# Patient Record
Sex: FEMALE | Race: WHITE | NOT HISPANIC OR LATINO
[De-identification: names, ages, dates, MRNs, and addresses within clinical notes are randomized per-mention and may not be internally consistent; named-entity substitution may affect disease eponyms.]

---

## 2017-10-18 ENCOUNTER — APPOINTMENT (OUTPATIENT)
Dept: UROGYNECOLOGY | Facility: CLINIC | Age: 67
End: 2017-10-18
Payer: MEDICARE

## 2017-10-18 DIAGNOSIS — N95.0 POSTMENOPAUSAL BLEEDING: ICD-10-CM

## 2017-10-18 DIAGNOSIS — N89.8 OTHER SPECIFIED COMPLICATION OF GENITOURINARY PROSTHETIC DEVICES, IMPLANTS AND GRAFTS, INITIAL ENCOUNTER: ICD-10-CM

## 2017-10-18 DIAGNOSIS — T83.89XA OTHER SPECIFIED COMPLICATION OF GENITOURINARY PROSTHETIC DEVICES, IMPLANTS AND GRAFTS, INITIAL ENCOUNTER: ICD-10-CM

## 2017-10-18 DIAGNOSIS — N81.2 INCOMPLETE UTEROVAGINAL PROLAPSE: ICD-10-CM

## 2017-10-18 PROCEDURE — 99213 OFFICE O/P EST LOW 20 MIN: CPT

## 2017-11-08 ENCOUNTER — RESULT REVIEW (OUTPATIENT)
Age: 67
End: 2017-11-08

## 2017-11-24 ENCOUNTER — APPOINTMENT (OUTPATIENT)
Dept: UROGYNECOLOGY | Facility: CLINIC | Age: 67
End: 2017-11-24
Payer: MEDICARE

## 2017-11-24 DIAGNOSIS — N81.11 CYSTOCELE, MIDLINE: ICD-10-CM

## 2017-11-24 DIAGNOSIS — N39.41 URGE INCONTINENCE: ICD-10-CM

## 2017-11-24 DIAGNOSIS — N95.2 POSTMENOPAUSAL ATROPHIC VAGINITIS: ICD-10-CM

## 2017-11-24 DIAGNOSIS — N89.8 OTHER SPECIFIED NONINFLAMMATORY DISORDERS OF VAGINA: ICD-10-CM

## 2017-11-24 PROCEDURE — 99213 OFFICE O/P EST LOW 20 MIN: CPT

## 2020-05-24 ENCOUNTER — TRANSCRIPTION ENCOUNTER (OUTPATIENT)
Age: 70
End: 2020-05-24

## 2022-02-26 ENCOUNTER — RESULT REVIEW (OUTPATIENT)
Age: 72
End: 2022-02-26

## 2022-08-08 ENCOUNTER — NON-APPOINTMENT (OUTPATIENT)
Age: 72
End: 2022-08-08

## 2022-08-24 ENCOUNTER — APPOINTMENT (OUTPATIENT)
Dept: ORTHOPEDIC SURGERY | Facility: CLINIC | Age: 72
End: 2022-08-24

## 2022-08-24 ENCOUNTER — NON-APPOINTMENT (OUTPATIENT)
Age: 72
End: 2022-08-24

## 2022-08-24 VITALS — SYSTOLIC BLOOD PRESSURE: 106 MMHG | DIASTOLIC BLOOD PRESSURE: 72 MMHG | HEART RATE: 77 BPM

## 2022-08-24 VITALS — HEIGHT: 63 IN | BODY MASS INDEX: 19.14 KG/M2 | WEIGHT: 108 LBS

## 2022-08-24 DIAGNOSIS — M54.50 LOW BACK PAIN, UNSPECIFIED: ICD-10-CM

## 2022-08-24 DIAGNOSIS — M42.05: ICD-10-CM

## 2022-08-24 DIAGNOSIS — M54.9 DORSALGIA, UNSPECIFIED: ICD-10-CM

## 2022-08-24 PROCEDURE — 72084 X-RAY EXAM ENTIRE SPI 6/> VW: CPT

## 2022-08-24 PROCEDURE — 99204 OFFICE O/P NEW MOD 45 MIN: CPT

## 2022-08-25 NOTE — PHYSICAL EXAM
[Stooped] : stooped [UE/LE] : Sensory: Intact in bilateral upper & lower extremities [ALL] : Biceps, brachioradialis, triceps, patellar, ankle and plantar 2+ and symmetric bilaterally [DP] : dorsalis pedis 2+ and symmetric bilaterally [Normal RLE] : Right Lower Extremity: No scars, rashes, lesions, ulcers, skin intact [Normal LLE] : Left Lower Extremity: No scars, rashes, lesions, ulcers, skin intact [Normal DTR Reflexes] : DTR reflexes normal [Normal] : Oriented to person, place, and time, insight and judgement were intact and the affect was normal [Poor Appearance] : well-appearing [Acute Distress] : not in acute distress [Obese] : not obese [de-identified] : skin intact with no lesions and no signs bruising, nor erythema [de-identified] : Deformed from kyphosis and scoliosis\par uneven shoulder blades in the erect position [de-identified] : Scoliosis films 6 views demonstrating Scheuermann's Kyphosis with associated scoliosis.\par Old compression fracture noted on the lateral views in the T-L junction.

## 2022-08-25 NOTE — HISTORY OF PRESENT ILLNESS
[Pain Location] : pain [T-Spine] : T-spine region [Lumbar] : lumbar region [Worsening] : worsening [___ yrs] : [unfilled] year(s) ago [Standing] : standing [Daily] : ~He/She~ states the symptoms seem to be occuring daily [Prolonged Standing] : worsened by prolonged standing [Walking] : worsened by walking [Acetaminophen] : relieved by acetaminophen [de-identified] : Patient is a 72-year-old female with complaints of low back pain for 2 years that she has had along with osteoporosis and currently is taking Prolia.  Patient also has a history of lumbar spine fractures.  Pain level 3/10 worse with walking patient does not take any anti-inflammatories or pain medications.  Patient does not recall of earlier history of postural disposition.